# Patient Record
Sex: MALE | Race: WHITE | Employment: FULL TIME | URBAN - METROPOLITAN AREA
[De-identification: names, ages, dates, MRNs, and addresses within clinical notes are randomized per-mention and may not be internally consistent; named-entity substitution may affect disease eponyms.]

---

## 2022-06-10 ENCOUNTER — OFFICE VISIT (OUTPATIENT)
Dept: UROLOGY | Age: 57
End: 2022-06-10
Payer: COMMERCIAL

## 2022-06-10 ENCOUNTER — TELEPHONE (OUTPATIENT)
Dept: UROLOGY | Age: 57
End: 2022-06-10

## 2022-06-10 DIAGNOSIS — N48.6 PEYRONIE'S DISEASE: Primary | ICD-10-CM

## 2022-06-10 LAB
BILIRUBIN, URINE, POC: NEGATIVE
BLOOD URINE, POC: NEGATIVE
GLUCOSE URINE, POC: NEGATIVE
KETONES, URINE, POC: NEGATIVE
LEUKOCYTE ESTERASE, URINE, POC: NEGATIVE
NITRITE, URINE, POC: NEGATIVE
PH, URINE, POC: 7.5 (ref 4.6–8)
PROTEIN,URINE, POC: NORMAL
SPECIFIC GRAVITY, URINE, POC: 1.02 (ref 1–1.03)
URINALYSIS CLARITY, POC: NORMAL
URINALYSIS COLOR, POC: NORMAL
UROBILINOGEN, POC: NORMAL

## 2022-06-10 PROCEDURE — 81003 URINALYSIS AUTO W/O SCOPE: CPT | Performed by: UROLOGY

## 2022-06-10 PROCEDURE — 99204 OFFICE O/P NEW MOD 45 MIN: CPT | Performed by: UROLOGY

## 2022-06-10 ASSESSMENT — ENCOUNTER SYMPTOMS
BLOOD IN STOOL: 0
NAUSEA: 0
BACK PAIN: 0
COUGH: 0
INDIGESTION: 0
EYE DISCHARGE: 0
WHEEZING: 0
CONSTIPATION: 0
SKIN LESIONS: 0
DIARRHEA: 0
ABDOMINAL PAIN: 0
HEARTBURN: 0
VOMITING: 0
EYE PAIN: 0
SHORTNESS OF BREATH: 0

## 2022-06-10 NOTE — PROGRESS NOTES
[facility]  [address]  [address] [facility] [place]  [facility], [address] [facility]  : 1965    Chief Complaint   Patient presents with    New Patient        HPI     Darryl Jordan is a 64 y.o. male Referred for Peyronies disease. He noted a nontender bump on his penis about 2 years ago. About one year later, started having curvature with erections. , took pentoxifylline for 3 months without improvement. Has a 45 degree curvature to the left. Has good erection quality. However sex is difficult due to the curvature . Photo on his phone shows 50 degree curvature to the left. History reviewed. No pertinent past medical history. Past Surgical History:   Procedure Laterality Date    HERNIA REPAIR       No current outpatient medications on file. No current facility-administered medications for this visit. No Known Allergies  Social History     Socioeconomic History    Marital status:      Spouse name: Not on file    Number of children: Not on file    Years of education: Not on file    Highest education level: Not on file   Occupational History    Not on file   Tobacco Use    Smoking status: Not on file    Smokeless tobacco: Not on file   Substance and Sexual Activity    Alcohol use: Not on file    Drug use: Not on file    Sexual activity: Not on file   Other Topics Concern    Not on file   Social History Narrative    Not on file     Social Determinants of Health     Financial Resource Strain:     Difficulty of Paying Living Expenses: Not on file   Food Insecurity:     Worried About Running Out of Food in the Last Year: Not on file    Yeny of Food in the Last Year: Not on file   Transportation Needs:     Lack of Transportation (Medical): Not on file    Lack of Transportation (Non-Medical):  Not on file   Physical Activity:     Days of Exercise per Week: Not on file    Minutes of Exercise per Session: Not on file   Stress:     Feeling of Stress : Not on file   Social Connections:     Frequency of Communication with Friends and Family: Not on file    Frequency of Social Gatherings with Friends and Family: Not on file    Attends Muslim Services: Not on file    Active Member of [address] [place] [address] [employer] or Organizations: Not on file    Attends Club or Organization Meetings: Not on file    Marital Status: Not on file   Intimate Partner Violence:     Fear of Current or Ex-Partner: Not on file    Emotionally Abused: Not on file    Physically Abused: Not on file    Sexually Abused: Not on file   Housing Stability:     Unable to Pay for Housing in the Last Year: Not on file    Number of [place] in the Last Year: Not on file    Unstable Housing in the Last Year: Not on file     Family History   Problem Relation Age of Onset    Heart Disease Father     Hypertension Father     Hypertension Mother        Review of Systems  Constitutional:   Negative for fever, chills, appetite change, malaise/fatigue, headaches and weight loss. Skin:  Negative for skin lesions, rash and itching. Eyes:  Negative for visual disturbance, eye pain and eye discharge. ENT:  Negative for difficulty articulating words, pain swallowing, high frequency hearing loss and dry mouth. Respiratory:  Negative for cough, blood in sputum, shortness of breath and wheezing. Cardiovascular:  Negative for chest pain, hypertension, irregular heartbeat, leg pain, leg swelling, regular rate and rhythm and varicose veins. GI:  Negative for nausea, vomiting, abdominal pain, blood in stool, constipation, diarrhea, indigestion and heartburn. Genitourinary:  Negative for urinary burning, hematuria, flank pain, recurrent UTIs, history of urolithiasis, nocturia, slower stream, straining, urgency, leakage w/ urge, frequent urination, incomplete emptying, erectile dysfunction, testicular pain, sexually transmitted disease, discharge and urethral stricture.   Musculoskeletal:  Negative for back pain, bone pain, arthralgias, tenderness, muscle weakness and neck pain. Neurological:  Negative for dizziness, focal weakness, numbness, seizures and tremors. Psychological:  Negative for depression and psychiatric problem. Endocrine:  Negative for cold intolerance, thirst, excessive urination, fatigue and heat intolerance. Hem/Lymphatic:  Negative for easy bleeding, easy bruising and frequent infections. There were no vitals taken for this visit. Physical Exam  General   Mental Status - Patient is alert and oriented X3. General Appearance - Not in acute distress. Build & Nutrition - Well nourished and Well developed. Gait - Normal.      Eye   Pupil - Bilateral - Normal, Equal and Round. Chest and Lung Exam   Auscultation:   Lung Sounds: - Normal, clear to auscultation. Cardiovascular   Cardiovascular examination reveals  - normal heart sounds, regular rate and rhythm with no murmurs. Abdomen   Palpation/Percussion: Palpation and Percussion of the abdomen reveal - No Rebound tenderness, No Rigidity (guarding), No hepatosplenomegaly and No Palpable abdominal masses. Auscultation: Auscultation of the abdomen reveals - Bowel sounds normal.      Male Genitourinary   Evaluation of genitourinary system reveals - scrotum non-tender, no masses, normal testes, no palpable masses, normal epididymides, urethral meatus normal, no discharge, penis circumcised, 1 cm mid dorsal plaque, extends into corpora. and normal seminal vesicles. Rectal   Anorectal Exam: Prostate - normal and 1+ enlarged. No nodules      Peripheral Vascular   Lower Extremity: Inspection - Bilateral - Inspection Normal.      Neurologic   Neurologic evaluation reveals  - upper and lower extremity deep tendon reflexes intact bilaterally . Cranial Nerves: - Cranial nerves are grossly intact.       Neuropsychiatric   The patient's mood and affect are described as  - normal.      Musculoskeletal  Global Assessment   Examination of related systems reveals - no generalized swelling or edema of extremities. Lymphatic  General Lymphatics   Description - No Generalized lymphadenopathy. Tenderness - Non Tender. Urinalysis  UA - Dipstick  Results for orders placed or performed in visit on 06/10/22   AMB POC URINALYSIS DIP STICK AUTO W/O MICRO   Result Value Ref Range    Color (UA POC)      Clarity (UA POC)      Glucose, Urine, POC Negative Negative    Bilirubin, Urine, POC Negative Negative    KETONES, Urine, POC Negative Negative    Specific Gravity, Urine, POC 1.020 1.001 - 1.035    Blood (UA POC) Negative Negative    pH, Urine, POC 7.5 4.6 - 8.0    Protein, Urine, POC Trace Negative    Urobilinogen, POC 1 mg/dL     Nitrite, Urine, POC Negative Negative    Leukocyte Esterase, Urine, POC Negative Negative       UA - Micro  WBC - 0  RBC - 0  Bacteria - 0  Epith - 0      Assessment and Plan    ICD-10-CM    1. Peyronie's disease  N48.6 AMB POC URINALYSIS DIP STICK AUTO W/O MICRO     we have discussed all options for Peyronies. Discussed injections with verapamil and Xiaflex. Discussed surgical tx with [place] plication, plaque incision or excision with grafting, inflatable penile prosthesis. Will plan Xiaflex injection . Risk of bleeding, pain , corporal rupture discussed. Orders Placed This Encounter   Procedures    AMB POC URINALYSIS DIP STICK AUTO W/O MICRO       Follow-up and Dispositions    · Return for will get PA for Xiaflex.

## 2022-08-08 ENCOUNTER — TELEPHONE (OUTPATIENT)
Dept: UROLOGY | Age: 57
End: 2022-08-08

## 2022-08-08 NOTE — TELEPHONE ENCOUNTER
Pt's wife called the office because she stated she has not heard about Parrisshantal Estrada. She has not heard if her  will be treated or not. She requested a call. Please advise, thank you!

## 2022-08-25 ENCOUNTER — TELEPHONE (OUTPATIENT)
Dept: UROLOGY | Age: 57
End: 2022-08-25

## 2022-08-25 NOTE — TELEPHONE ENCOUNTER
Pt wife called and LVM wanting to schedule Xiaflex injections. Called pt informed him we cant schedule until we get the medication in office. Pt understood. I informed pt I would call [facility] and find out were the medication is.

## 2022-09-08 NOTE — TELEPHONE ENCOUNTER
Called pt no answer, lvm informing pt we are just waiting on the [employer] certification and that should get the process going with his Xiaflex, and that I would call him to get him scheduled when I receive his medication.

## 2022-11-04 ENCOUNTER — OFFICE VISIT (OUTPATIENT)
Dept: UROLOGY | Age: 57
End: 2022-11-04
Payer: COMMERCIAL

## 2022-11-04 DIAGNOSIS — N48.6 PEYRONIE'S DISEASE: Primary | ICD-10-CM

## 2022-11-04 PROCEDURE — 54200 INJECTION PX PEYRONIE DS: CPT | Performed by: UROLOGY

## 2022-11-04 ASSESSMENT — ENCOUNTER SYMPTOMS: NAUSEA: 0

## 2022-11-04 NOTE — PROGRESS NOTES
[facility]  [address]  [address] [facility], [address] [facility]  [phone]    Castro Dumont  : 1965    Chief Complaint   Patient presents with    Follow-up        HPI     Castro Dumont is a 62 y.o. male    Referred for Peyronies disease. He noted a nontender bump on his penis about 2 years ago. About one year later, started having curvature with erections. , took pentoxifylline for 3 months without improvement. Has a 45 degree curvature to the left. Has good erection quality. However sex is difficult due to the curvature . Photo on his phone shows 50 degree curvature to the left. Penile exam: 1 cm mid dorsal plaque, extends into corpora. Here for xiaflex injection. No past medical history on file. Past Surgical History:   Procedure Laterality Date    HERNIA REPAIR       No current outpatient medications on file. No current facility-administered medications for this visit.      No Known Allergies  Social History     Socioeconomic History    Marital status:      Spouse name: Not on file    Number of children: Not on file    Years of education: Not on file    Highest education level: Not on file   Occupational History    Not on file   Tobacco Use    Smoking status: Not on file    Smokeless tobacco: Not on file   Substance and Sexual Activity    Alcohol use: Not on file    Drug use: Not on file    Sexual activity: Not on file   Other Topics Concern    Not on file   Social History Narrative    Not on file     Social Determinants of Health     Financial Resource Strain: Not on file   Food Insecurity: Not on file   Transportation Needs: Not on file   Physical Activity: Not on file   Stress: Not on file   Social Connections: Not on file   Intimate Partner Violence: Not on file   Housing Stability: Not on file     Family History   Problem Relation Age of Onset    Heart Disease Father     Hypertension Father     Hypertension Mother        Review of Systems  Constitutional:   Negative for fever. GI:  Negative for nausea. Genitourinary:  Negative for flank pain. There were no vitals taken for this visit. Urinalysis  UA - Dipstick  Results for orders placed or performed in visit on 06/10/22   AMB POC URINALYSIS DIP STICK AUTO W/O MICRO   Result Value Ref Range    Color (UA POC)      Clarity (UA POC)      Glucose, Urine, POC Negative Negative    Bilirubin, Urine, POC Negative Negative    KETONES, Urine, POC Negative Negative    Specific Gravity, Urine, POC 1.020 1.001 - 1.035    Blood (UA POC) Negative Negative    pH, Urine, POC 7.5 4.6 - 8.0    Protein, Urine, POC Trace Negative    Urobilinogen, POC 1 mg/dL     Nitrite, Urine, POC Negative Negative    Leukocyte Esterase, Urine, POC Negative Negative       UA - Micro  WBC - 0  RBC - 0  Bacteria - 0  Epith - 0    Physical Exam    Assessment and Plan    Leonard Whittington was seen today for follow-up. Diagnoses and all orders for this visit:    Peyronie's disease  -     INJECT PROC PENILE PLAQUE     Injected Xiaflex into mid dorsal plaque. Follow-up and Dispositions    Return for keep previous appt.

## 2022-11-07 ENCOUNTER — OFFICE VISIT (OUTPATIENT)
Dept: UROLOGY | Age: 57
End: 2022-11-07
Payer: COMMERCIAL

## 2022-11-07 DIAGNOSIS — N48.6 PEYRONIE'S DISEASE: Primary | ICD-10-CM

## 2022-11-07 LAB
BILIRUBIN, URINE, POC: NEGATIVE
BLOOD URINE, POC: ABNORMAL
GLUCOSE URINE, POC: NEGATIVE
KETONES, URINE, POC: NEGATIVE
LEUKOCYTE ESTERASE, URINE, POC: ABNORMAL
NITRITE, URINE, POC: ABNORMAL
PH, URINE, POC: 5.5 (ref 4.6–8)
PROTEIN,URINE, POC: NEGATIVE
SPECIFIC GRAVITY, URINE, POC: 1.02 (ref 1–1.03)
URINALYSIS CLARITY, POC: CLEAR
URINALYSIS COLOR, POC: YELLOW
UROBILINOGEN, POC: 0.2

## 2022-11-07 PROCEDURE — 81003 URINALYSIS AUTO W/O SCOPE: CPT | Performed by: UROLOGY

## 2022-11-07 PROCEDURE — 54200 INJECTION PX PEYRONIE DS: CPT | Performed by: UROLOGY

## 2022-11-07 NOTE — PROGRESS NOTES
[facility]  [address]  [address] [facility]  [facility], [address] [facility]  [phone]    Kourtney Priest  : 1965    Chief Complaint   Patient presents with    Follow-up     Peyronie's Disease        HPI     Kourtney Priest is a 62 y.o. male   Referred for Peyronies disease. He noted a nontender bump on his penis about 2 years ago. About one year later, started having curvature with erections. , took pentoxifylline for 3 months without improvement. Has a 45 degree curvature to the left. Has good erection quality. However sex is difficult due to the curvature . Photo on his phone shows 50 degree curvature to the left. Penile exam: 1 cm mid dorsal plaque, extends into corpora. Here for xiaflex injection. No past medical history on file. Past Surgical History:   Procedure Laterality Date    HERNIA REPAIR       No current outpatient medications on file. No current facility-administered medications for this visit.      No Known Allergies  Social History     Socioeconomic History    Marital status:      Spouse name: Not on file    Number of children: Not on file    Years of education: Not on file    Highest education level: Not on file   Occupational History    Not on file   Tobacco Use    Smoking status: Not on file    Smokeless tobacco: Not on file   Substance and Sexual Activity    Alcohol use: Not on file    Drug use: Not on file    Sexual activity: Not on file   Other Topics Concern    Not on file   Social History Narrative    Not on file     Social Determinants of Health     Financial Resource Strain: Not on file   Food Insecurity: Not on file   Transportation Needs: Not on file   Physical Activity: Not on file   Stress: Not on file   Social Connections: Not on file   Intimate Partner Violence: Not on file   Housing Stability: Not on file     Family History   Problem Relation Age of Onset    Heart Disease Father     Hypertension Father     Hypertension Mother        ROS    There were no vitals taken for this visit. Urinalysis  UA - Dipstick  Results for orders placed or performed in visit on 11/07/22   AMB POC URINALYSIS DIP STICK AUTO W/O MICRO   Result Value Ref Range    Color, Urine, POC yellow     Clarity, Urine, POC clear     Glucose, Urine, POC Negative Negative    Bilirubin, Urine, POC Negative Negative    Ketones, Urine, POC Negative Negative    Specific Gravity, Urine, POC 1.025 1.001 - 1.035    Blood, Urine, POC neg Negative    pH, Urine, POC 5.5 4.6 - 8.0    Protein, Urine, POC Negative Negative    Urobilinogen, POC 0.2     Nitrate, Urine, POC neg Negative    Leukocyte Esterase, Urine, POC Trace (A) Negative   Results for orders placed or performed in visit on 06/10/22   AMB POC URINALYSIS DIP STICK AUTO W/O MICRO   Result Value Ref Range    Color (UA POC)      Clarity (UA POC)      Glucose, Urine, POC Negative Negative    Bilirubin, Urine, POC Negative Negative    KETONES, Urine, POC Negative Negative    Specific Gravity, Urine, POC 1.020 1.001 - 1.035    Blood (UA POC) Negative Negative    pH, Urine, POC 7.5 4.6 - 8.0    Protein, Urine, POC Trace Negative    Urobilinogen, POC 1 mg/dL     Nitrite, Urine, POC Negative Negative    Leukocyte Esterase, Urine, POC Negative Negative       UA - Micro  WBC - 0  RBC - 0  Bacteria - 0  Epith - 0    Physical Exam    Assessment and Plan    Nimco Carias was seen today for follow-up. Diagnoses and all orders for this visit:    Peyronie's disease  -     AMB POC URINALYSIS DIP STICK AUTO W/O MICRO  -     INJECT PROC PENILE PLAQUE     Injected xiaflex into dorsal penile plaque. He will start penile exercises. Follow-up and Dispositions    Return in about 6 weeks (around 12/19/2022).

## 2023-04-25 ENCOUNTER — TELEPHONE (OUTPATIENT)
Dept: UROLOGY | Age: 58
End: 2023-04-25

## 2023-05-01 ENCOUNTER — TELEPHONE (OUTPATIENT)
Dept: UROLOGY | Age: 58
End: 2023-05-01

## 2023-05-01 NOTE — TELEPHONE ENCOUNTER
Called patient and let him know I have paper rx for xiaflex prescription at the front. I can mail it if that's what he prefers.  I left a message on his answering machine

## 2023-05-16 ENCOUNTER — TELEPHONE (OUTPATIENT)
Dept: UROLOGY | Age: 58
End: 2023-05-16

## 2023-06-05 ENCOUNTER — OFFICE VISIT (OUTPATIENT)
Dept: UROLOGY | Age: 58
End: 2023-06-05
Payer: COMMERCIAL

## 2023-06-05 DIAGNOSIS — N48.6 PEYRONIE'S DISEASE: Primary | ICD-10-CM

## 2023-06-05 PROCEDURE — 54200 INJECTION PX PEYRONIE DS: CPT | Performed by: UROLOGY

## 2023-06-05 ASSESSMENT — ENCOUNTER SYMPTOMS
BACK PAIN: 0
NAUSEA: 0

## 2023-06-05 NOTE — PROGRESS NOTES
[facility]  [address]  [address] [facility], [address] [facility]  [phone]    Ronaldo Coppola  : 1965    Chief Complaint   Patient presents with    Follow-up        HPI     Ronaldo Coppola is a 62 y.o. male Referred for Peyronies disease. He noted a nontender bump on his penis about 2 years ago. About one year later, started having curvature with erections. , took pentoxifylline for 3 months without improvement. Has a 45 degree curvature to the left. Has good erection quality. However sex is difficult due to the curvature . Photo on his phone shows 50 degree curvature to the left. Penile exam: 1 cm mid dorsal plaque, extends into corpora. Completed first round of [place] Post[place] in . He did the stretching exercises but didn't note much improvement. No past medical history on file. Past Surgical History:   Procedure Laterality Date    HERNIA REPAIR       No current outpatient medications on file. No current facility-administered medications for this visit.      No Known Allergies  Social History     Socioeconomic History    Marital status:      Spouse name: Not on file    Number of children: Not on file    Years of education: Not on file    Highest education level: Not on file   Occupational History    Not on file   Tobacco Use    Smoking status: Not on file    Smokeless tobacco: Not on file   Substance and Sexual Activity    Alcohol use: Not on file    Drug use: Not on file    Sexual activity: Not on file   Other Topics Concern    Not on file   Social History Narrative    Not on file     Social Determinants of Health     Financial Resource Strain: Not on file   Food Insecurity: Not on file   Transportation Needs: Not on file   Physical Activity: Not on file   Stress: Not on file   Social Connections: Not on file   Intimate Partner Violence: Not on file   Housing Stability: Not on file     Family History   Problem Relation Age of Onset    Heart Disease Father     Hypertension Father

## 2023-08-29 RX ORDER — COLLAGENASE CLOSTRIDIUM HISTOLYTICUM 0.9 MG
KIT INJECTION
Refills: 3 | OUTPATIENT
Start: 2023-08-29

## 2023-08-29 RX ORDER — NEEDLES, SAFETY 22GX1 1/2"
NEEDLE, DISPOSABLE MISCELLANEOUS
Refills: 3 | OUTPATIENT
Start: 2023-08-29

## 2023-09-18 ENCOUNTER — OFFICE VISIT (OUTPATIENT)
Dept: UROLOGY | Age: 58
End: 2023-09-18
Payer: COMMERCIAL

## 2023-09-18 ENCOUNTER — TELEPHONE (OUTPATIENT)
Dept: UROLOGY | Age: 58
End: 2023-09-18

## 2023-09-18 DIAGNOSIS — N48.6 PEYRONIE'S DISEASE: Primary | ICD-10-CM

## 2023-09-18 PROCEDURE — 54200 INJECTION PX PEYRONIE DS: CPT | Performed by: UROLOGY

## 2023-09-18 ASSESSMENT — ENCOUNTER SYMPTOMS
NAUSEA: 0
BACK PAIN: 0

## 2023-09-18 NOTE — TELEPHONE ENCOUNTER
Patient states she's told that they need a new prescription each time for Xiaflex. Wants to know will it be called in.

## 2023-09-20 ENCOUNTER — OFFICE VISIT (OUTPATIENT)
Dept: UROLOGY | Age: 58
End: 2023-09-20
Payer: COMMERCIAL

## 2023-09-20 DIAGNOSIS — N48.6 PEYRONIE'S DISEASE: Primary | ICD-10-CM

## 2023-09-20 PROCEDURE — 54200 INJECTION PX PEYRONIE DS: CPT | Performed by: UROLOGY

## 2023-09-20 ASSESSMENT — ENCOUNTER SYMPTOMS
NAUSEA: 0
BACK PAIN: 0

## 2023-09-20 NOTE — PROGRESS NOTES
[facility]  [address]  [phone]    Justyna Candelaria  : 1965    Chief Complaint   Patient presents with    Follow-up        HPI     Justyna Candelaria is a 62 y.o. male   Referred for Peyronies disease. He noted a nontender bump on his penis about 2 years ago. About one year later, started having curvature with erections. , took pentoxifylline for 3 months without improvement. Has a 45 degree curvature to the left. Has good erection quality. However sex is difficult due to the curvature . Photo on his phone shows 50 degree curvature to the left. Penile exam: 1 cm mid dorsal plaque, extends into corpora. Completed first round of [place] in . He did the stretching exercises but didn't note much improvement. Finished second round Xiaflex 23. His curvature is better, now about 30 degrees. Here for second injection of 3rd round Xiaflex. No past medical history on file. Past Surgical History:   Procedure Laterality Date    HERNIA REPAIR       No current outpatient medications on file. No current facility-administered medications for this visit.      No Known Allergies  Social History     Socioeconomic History    Marital status:      Spouse name: Not on file    Number of children: Not on file    Years of education: Not on file    Highest education level: Not on file   Occupational History    Not on file   Tobacco Use    Smoking status: Not on file    Smokeless tobacco: Not on file   Substance and Sexual Activity    Alcohol use: Not on file    Drug use: Not on file    Sexual activity: Not on file   Other Topics Concern    Not on file   Social History Narrative    Not on file     Social Determinants of Health     Financial Resource Strain: Not on file   Food Insecurity: Not on file   Transportation Needs: Not on file   Physical Activity: Not on file   Stress: Not on file   Social Connections: Not on file   Intimate Partner Violence: Not

## 2023-10-30 ENCOUNTER — OFFICE VISIT (OUTPATIENT)
Dept: UROLOGY | Age: 58
End: 2023-10-30
Payer: COMMERCIAL

## 2023-10-30 DIAGNOSIS — N48.6 PEYRONIE'S DISEASE: Primary | ICD-10-CM

## 2023-10-30 LAB
BILIRUBIN, URINE, POC: NEGATIVE
BLOOD URINE, POC: NEGATIVE
GLUCOSE URINE, POC: NEGATIVE
KETONES, URINE, POC: NEGATIVE
LEUKOCYTE ESTERASE, URINE, POC: NEGATIVE
NITRITE, URINE, POC: NEGATIVE
PH, URINE, POC: 6 (ref 4.6–8)
PROTEIN,URINE, POC: NEGATIVE
SPECIFIC GRAVITY, URINE, POC: 1.02 (ref 1–1.03)
URINALYSIS CLARITY, POC: NORMAL
URINALYSIS COLOR, POC: NORMAL
UROBILINOGEN, POC: NORMAL

## 2023-10-30 PROCEDURE — 99213 OFFICE O/P EST LOW 20 MIN: CPT | Performed by: UROLOGY

## 2023-10-30 PROCEDURE — 81003 URINALYSIS AUTO W/O SCOPE: CPT | Performed by: UROLOGY

## 2023-10-30 ASSESSMENT — ENCOUNTER SYMPTOMS
BACK PAIN: 0
NAUSEA: 0

## 2023-10-30 NOTE — PROGRESS NOTES
[facility]  [address]  [phone]    Cinda Washington  : 1965    Chief Complaint   Patient presents with    Follow-up        HPI     Cinda Washington is a 62 y.o. male   Referred for Peyronies disease. He noted a nontender bump on his penis about 2 years ago. About one year later, started having curvature with erections. , took pentoxifylline for 3 months without improvement. Has a 45 degree curvature to the left. Has good erection quality. However sex is difficult due to the curvature . Photo on his phone shows 50 degree curvature to the left. Penile exam: 1 cm mid dorsal plaque, extends into corpora. Completed first round of [employer] Human in . He did the stretching exercises but didn't note much improvement. Finished second round Xiaflex 23. His curvature is better, now about 30 degrees. second injection of 3rd round Zeenat Human was given on 23. He thinks that curvature is a little better, now about 25 degrees. No past medical history on file. Past Surgical History:   Procedure Laterality Date    HERNIA REPAIR       No current outpatient medications on file. No current facility-administered medications for this visit.      No Known Allergies  Social History     Socioeconomic History    Marital status:      Spouse name: Not on file    Number of children: Not on file    Years of education: Not on file    Highest education level: Not on file   Occupational History    Not on file   Tobacco Use    Smoking status: Not on file    Smokeless tobacco: Not on file   Substance and Sexual Activity    Alcohol use: Not on file    Drug use: Not on file    Sexual activity: Not on file   Other Topics Concern    Not on file   Social History Narrative    Not on file     Social Determinants of Health     Financial Resource Strain: Not on file   Food Insecurity: Not on file   Transportation Needs: Not on file   Physical Activity: Not on file   Stress:

## 2023-11-20 ENCOUNTER — OFFICE VISIT (OUTPATIENT)
Dept: UROLOGY | Age: 58
End: 2023-11-20
Payer: COMMERCIAL

## 2023-11-20 DIAGNOSIS — N48.6 PEYRONIE'S DISEASE: Primary | ICD-10-CM

## 2023-11-20 PROCEDURE — 54200 INJECTION PX PEYRONIE DS: CPT | Performed by: UROLOGY

## 2023-11-20 ASSESSMENT — ENCOUNTER SYMPTOMS
BACK PAIN: 0
NAUSEA: 0

## 2023-11-20 NOTE — PROGRESS NOTES
file   Stress: Not on file   Social Connections: Not on file   Intimate Partner Violence: Not on file   Housing Stability: Not on file     Family History   Problem Relation Age of Onset    Heart Disease Father     Hypertension Father     Hypertension Mother        Review of Systems  Constitutional:   Negative for fever. GI:  Negative for nausea. Musculoskeletal:  Negative for back pain. There were no vitals taken for this visit. Urinalysis  UA - Dipstick  Results for orders placed or performed in visit on 10/30/23   AMB POC URINALYSIS DIP STICK AUTO W/O MICRO   Result Value Ref Range    Color (UA POC)      Clarity (UA POC)      Glucose, Urine, POC Negative Negative    Bilirubin, Urine, POC Negative Negative    KETONES, Urine, POC Negative Negative    Specific Gravity, Urine, POC 1.020 1.001 - 1.035    Blood (UA POC) Negative Negative    pH, Urine, POC 6.0 4.6 - 8.0    Protein, Urine, POC Negative Negative    Urobilinogen, POC Normal     Nitrite, Urine, POC Negative Negative    Leukocyte Esterase, Urine, POC Negative Negative   Results for orders placed or performed in visit on 11/07/22   AMB POC URINALYSIS DIP STICK AUTO W/O MICRO   Result Value Ref Range    Color, Urine, POC yellow     Clarity, Urine, POC clear     Glucose, Urine, POC Negative Negative    Bilirubin, Urine, POC Negative Negative    Ketones, Urine, POC Negative Negative    Specific Gravity, Urine, POC 1.025 1.001 - 1.035    Blood, Urine, POC neg Negative    pH, Urine, POC 5.5 4.6 - 8.0    Protein, Urine, POC Negative Negative    Urobilinogen, POC 0.2     Nitrite, Urine, POC neg Negative    Leukocyte Esterase, Urine, POC Trace (A) Negative       UA - Micro  WBC - 0  RBC - 0  Bacteria - 0  Epith - 0    Physical Exam    Assessment and Plan    Armando was seen today for injections.     Diagnoses and all orders for this visit:    Peyronie's disease  -     INJECT PROC PENILE PLAQUE     Injected Xiaflex in right side of plaque    Follow-up and

## 2023-11-22 ENCOUNTER — OFFICE VISIT (OUTPATIENT)
Dept: UROLOGY | Age: 58
End: 2023-11-22
Payer: COMMERCIAL

## 2023-11-22 DIAGNOSIS — N48.6 PEYRONIE'S DISEASE: Primary | ICD-10-CM

## 2023-11-22 PROCEDURE — 54200 INJECTION PX PEYRONIE DS: CPT | Performed by: UROLOGY

## 2023-11-22 ASSESSMENT — ENCOUNTER SYMPTOMS
BACK PAIN: 0
NAUSEA: 0

## 2023-11-22 NOTE — PROGRESS NOTES
[facility]  [address] [facility] [address] [address] [address]  Sabra Masters  : 1965    Chief Complaint   Patient presents with    Injections        HPI     Alba Leblanc is a 62 y.o. male Referred for Peyronies disease. He noted a nontender bump on his penis about 2 years ago. About one year later, started having curvature with erections. , took pentoxifylline for 3 months without improvement. Has a 45 degree curvature to the left. Has good erection quality. However sex is difficult due to the curvature . Photo on his phone shows 50 degree curvature to the left. Penile exam: 1 cm mid dorsal plaque, extends into corpora. Completed first round of Chevis Khat in . He did the stretching exercises but didn't note much improvement. Finished second round Xiaflex 23. His curvature is better, now about 30 degrees. second injection of 3rd round Chevis Khat was given on 23. He thinks that curvature is a little better, now about 25 degrees. No past medical history on file. Past Surgical History:   Procedure Laterality Date    HERNIA REPAIR       No current outpatient medications on file. No current facility-administered medications for this visit.      No Known Allergies  Social History     Socioeconomic History    Marital status:      Spouse name: Not on file    Number of children: Not on file    Years of education: Not on file    Highest education level: Not on file   Occupational History    Not on file   Tobacco Use    Smoking status: Not on file    Smokeless tobacco: Not on file   Substance and Sexual Activity    Alcohol use: Not on file    Drug use: Not on file    Sexual activity: Not on file   Other Topics Concern    Not on file   Social History Narrative    Not on file     Social Determinants of Health     Financial Resource Strain: Not on file   Food Insecurity: Not on file   Transportation Needs: Not on file   Physical Activity: Not on file   Stress: Not on

## 2024-01-03 ENCOUNTER — OFFICE VISIT (OUTPATIENT)
Dept: UROLOGY | Age: 59
End: 2024-01-03
Payer: COMMERCIAL

## 2024-01-03 DIAGNOSIS — N48.6 PEYRONIE'S DISEASE: Primary | ICD-10-CM

## 2024-01-03 DIAGNOSIS — N52.9 ERECTILE DYSFUNCTION, UNSPECIFIED ERECTILE DYSFUNCTION TYPE: ICD-10-CM

## 2024-01-03 LAB
BILIRUBIN, URINE, POC: NEGATIVE
BLOOD URINE, POC: NEGATIVE
GLUCOSE URINE, POC: NEGATIVE
KETONES, URINE, POC: NEGATIVE
LEUKOCYTE ESTERASE, URINE, POC: NEGATIVE
NITRITE, URINE, POC: NEGATIVE
PH, URINE, POC: 6 (ref 4.6–8)
PROTEIN,URINE, POC: NEGATIVE
SPECIFIC GRAVITY, URINE, POC: 1.03 (ref 1–1.03)
URINALYSIS CLARITY, POC: NORMAL
URINALYSIS COLOR, POC: NORMAL
UROBILINOGEN, POC: NORMAL

## 2024-01-03 PROCEDURE — 81003 URINALYSIS AUTO W/O SCOPE: CPT | Performed by: UROLOGY

## 2024-01-03 PROCEDURE — 99213 OFFICE O/P EST LOW 20 MIN: CPT | Performed by: UROLOGY

## 2024-01-03 RX ORDER — SILDENAFIL 100 MG/1
100 TABLET, FILM COATED ORAL PRN
Qty: 20 TABLET | Refills: 12 | Status: SHIPPED | OUTPATIENT
Start: 2024-01-03

## 2024-01-03 ASSESSMENT — ENCOUNTER SYMPTOMS
BACK PAIN: 0
NAUSEA: 0

## 2024-01-03 NOTE — PROGRESS NOTES
Drug use: Not on file    Sexual activity: Not on file   Other Topics Concern    Not on file   Social History Narrative    Not on file     Social Determinants of Health     Financial Resource Strain: Not on file   Food Insecurity: Not on file   Transportation Needs: Not on file   Physical Activity: Not on file   Stress: Not on file   Social Connections: Not on file   Intimate Partner Violence: Not on file   Housing Stability: Not on file     Family History   Problem Relation Age of Onset    Heart Disease Father     Hypertension Father     Hypertension Mother        Review of Systems  Constitutional:   Negative for fever.  GI:  Negative for nausea.  Musculoskeletal:  Negative for back pain.      There were no vitals taken for this visit.    Urinalysis  UA - Dipstick  Results for orders placed or performed in visit on 01/03/24   AMB POC URINALYSIS DIP STICK AUTO W/O MICRO   Result Value Ref Range    Color (UA POC)      Clarity (UA POC)      Glucose, Urine, POC Negative     Bilirubin, Urine, POC Negative     KETONES, Urine, POC Negative     Specific Gravity, Urine, POC 1.030 1.001 - 1.035    Blood (UA POC) Negative     pH, Urine, POC 6.0 4.6 - 8.0    Protein, Urine, POC Negative     Urobilinogen, POC 4 mg/dL     Nitrite, Urine, POC Negative     Leukocyte Esterase, Urine, POC Negative    Results for orders placed or performed in visit on 11/07/22   AMB POC URINALYSIS DIP STICK AUTO W/O MICRO   Result Value Ref Range    Color, Urine, POC yellow     Clarity, Urine, POC clear     Glucose, Urine, POC Negative Negative    Bilirubin, Urine, POC Negative Negative    Ketones, Urine, POC Negative Negative    Specific Gravity, Urine, POC 1.025 1.001 - 1.035    Blood, Urine, POC neg Negative    pH, Urine, POC 5.5 4.6 - 8.0    Protein, Urine, POC Negative Negative    Urobilinogen, POC 0.2     Nitrite, Urine, POC neg Negative    Leukocyte Esterase, Urine, POC Trace (A) Negative       UA - Micro  WBC - 0  RBC - 0  Bacteria - 0  Epith

## 2024-04-17 ENCOUNTER — OFFICE VISIT (OUTPATIENT)
Dept: UROLOGY | Age: 59
End: 2024-04-17
Payer: COMMERCIAL

## 2024-04-17 DIAGNOSIS — N48.6 PEYRONIE'S DISEASE: Primary | ICD-10-CM

## 2024-04-17 DIAGNOSIS — N52.9 ERECTILE DYSFUNCTION, UNSPECIFIED ERECTILE DYSFUNCTION TYPE: ICD-10-CM

## 2024-04-17 PROCEDURE — 81003 URINALYSIS AUTO W/O SCOPE: CPT | Performed by: UROLOGY

## 2024-04-17 PROCEDURE — 99212 OFFICE O/P EST SF 10 MIN: CPT | Performed by: UROLOGY

## 2024-04-17 ASSESSMENT — ENCOUNTER SYMPTOMS
BACK PAIN: 0
NAUSEA: 0

## 2024-04-17 NOTE — PROGRESS NOTES
POC 0.2     Nitrite, Urine, POC neg Negative    Leukocyte Esterase, Urine, POC Trace (A) Negative       UA - Micro  WBC - 0  RBC - 0  Bacteria - 0  Epith - 0    Physical Exam    Assessment and Plan  Armando was seen today for follow-up.    Diagnoses and all orders for this visit:    Peyronie's disease  -     AMB POC URINALYSIS DIP STICK AUTO W/O MICRO    Erectile dysfunction, unspecified erectile dysfunction type  -     AMB POC URINALYSIS DIP STICK AUTO W/O MICRO         Follow-up and Dispositions    Return if symptoms worsen or fail to improve.

## 2025-06-05 DIAGNOSIS — N52.9 ERECTILE DYSFUNCTION, UNSPECIFIED ERECTILE DYSFUNCTION TYPE: ICD-10-CM

## 2025-06-06 RX ORDER — SILDENAFIL 100 MG/1
100 TABLET, FILM COATED ORAL PRN
Qty: 20 TABLET | Refills: 12 | Status: SHIPPED | OUTPATIENT
Start: 2025-06-06